# Patient Record
Sex: MALE | Race: WHITE | NOT HISPANIC OR LATINO | Employment: FULL TIME | URBAN - METROPOLITAN AREA
[De-identification: names, ages, dates, MRNs, and addresses within clinical notes are randomized per-mention and may not be internally consistent; named-entity substitution may affect disease eponyms.]

---

## 2020-08-24 ENCOUNTER — TELEMEDICINE (OUTPATIENT)
Dept: FAMILY MEDICINE CLINIC | Facility: CLINIC | Age: 20
End: 2020-08-24

## 2020-08-24 DIAGNOSIS — N50.811 TESTICULAR PAIN, RIGHT: Primary | ICD-10-CM

## 2020-08-24 PROCEDURE — 99203 OFFICE O/P NEW LOW 30 MIN: CPT | Performed by: FAMILY MEDICINE

## 2020-08-24 NOTE — ASSESSMENT & PLAN NOTE
Unable to examine patient as this was a video visit.  Differential remains broad.  Being hernia versus epididymitis versus other cause.  Will order ultrasound to further evaluate.  Will contact patient with ultrasound of results and if they are inconclusive will have patient come in for further testing.

## 2020-08-24 NOTE — PROGRESS NOTES
Conor Tabor is a 20 y.o. male who presents today to establish care.    Chief Complaint   Patient presents with   • Testicle Pain        Patient is here to established care. He was previously being seen by Dr. Mora at [facility] in [facility]. He has no chronic medical conditions. His only complaint today is of testicular pain which causes his stomach and low back to hurt as well.     Testicle Pain   The patient's primary symptoms include testicular pain. The patient's pertinent negatives include no penile discharge or penile pain. Primary symptoms comment: Patient is having pain in his right testicle, low back, and stomach since last 1-2 months. He has had this problem in the past when he was younger but his doctor was unable to find anything wrong. . This is a recurrent (5 years ago had similar pain but only involved the testicle. ) problem. The problem occurs constantly. The problem has been waxing and waning. The pain is severe (7/10). Associated symptoms include abdominal pain, flank pain, nausea and painful intercourse. Pertinent negatives include no anorexia, chest pain, chills, constipation, coughing, diarrhea, discolored urine, dysuria, fever, frequency, headaches, hematuria, hesitancy, joint pain, joint swelling, rash, shortness of breath, sore throat, urgency, urinary retention or vomiting. There is no reported injury. The testicular pain affects the right testicle. There is swelling in the right (minimal ) testicle. The color of the testicles is normal. The symptoms are aggravated by tactile pressure, intercourse and heavy lifting. He has tried nothing for the symptoms. He is sexually active. He never uses condoms. No, his partner does not have an STD. His past medical history is significant for chlamydia (Treated for clamydia in May. Feels different than that. Patient and partner both treated. ). There is no history of erectile aid use, erectile dysfunction, a femoral hernia, gonorrhea, herpes  simplex, HIV, an inguinal hernia, kidney stones or varicocele.        Review of Systems   Constitutional: Negative for chills, fever and unexpected weight loss.   HENT: Negative for congestion, ear pain and sore throat.    Eyes: Negative for visual disturbance.   Respiratory: Negative for cough, shortness of breath and wheezing.    Cardiovascular: Negative for chest pain and palpitations.   Gastrointestinal: Positive for abdominal pain and nausea. Negative for anorexia, blood in stool, constipation, diarrhea, vomiting and GERD.   Endocrine: Negative for polydipsia and polyuria.   Genitourinary: Positive for flank pain and testicular pain. Negative for decreased urine volume, difficulty urinating, discharge, dysuria, frequency, genital sores, hematuria, hesitancy, penile pain, erectile dysfunction, urgency and urinary incontinence.   Musculoskeletal: Negative for joint pain and joint swelling.   Skin: Negative for rash and skin lesions.   Allergic/Immunologic: Negative for environmental allergies.   Neurological: Negative for seizures, syncope and headache.   Hematological: Does not bruise/bleed easily.   Psychiatric/Behavioral: Negative for suicidal ideas.          History reviewed. No pertinent past medical history.     Past Surgical History:   Procedure Laterality Date   • BONE GRAFT      Maxillary bone graft after getting front tooth knocked out during baseball.    • WISDOM TOOTH EXTRACTION          Family History   Problem Relation Age of Onset   • No Known Problems Mother    • No Known Problems Father    • No Known Problems Brother    • Other Maternal Grandmother         unknown   • No Known Problems Maternal Grandfather    • Other Paternal Grandmother         unknown   • No Known Problems Paternal Grandfather    • No Known Problems Brother         Social History     Socioeconomic History   • Marital status: Single     Spouse name: Not on file   • Number of children: Not on file   • Years of education: Not on  file   • Highest education level: Not on file   Tobacco Use   • Smoking status: Current Every Day Smoker     Types: Electronic Cigarette   • Smokeless tobacco: Never Used   Substance and Sexual Activity   • Alcohol use: Yes     Frequency: 2-4 times a month     Drinks per session: 1 or 2   • Drug use: Yes     Frequency: 7.0 times per week     Types: Marijuana     Comment: daily   • Sexual activity: Yes     Partners: Female     Birth control/protection: OCP     Comment: 1 female partner in the past year.        Current Outpatient Medications on File Prior to Visit   Medication Sig Dispense Refill   • [DISCONTINUED] ondansetron ODT (ZOFRAN-ODT) 4 MG disintegrating tablet Place 1 tablet on the tongue Every 8 (Eight) Hours As Needed for Nausea or Vomiting. 6 tablet 0     No current facility-administered medications on file prior to visit.        No Known Allergies     There were no vitals taken for this visit.   There is no height or weight on file to calculate BMI.    Physical Exam   Constitutional: He is oriented to person, place, and time. He appears well-developed and well-nourished. No distress.   Pulmonary/Chest: Effort normal. No respiratory distress.   Neurological: He is alert and oriented to person, place, and time.   Psychiatric: He has a normal mood and affect.        No results found for this or any previous visit.     Problems Addressed this Visit        Nervous and Auditory    Testicular pain, right - Primary     Unable to examine patient as this was a video visit.  Differential remains broad.  Being hernia versus epididymitis versus other cause.  Will order ultrasound to further evaluate.  Will contact patient with ultrasound of results and if they are inconclusive will have patient come in for further testing.         Relevant Orders    US Scrotum & Testicles          Return in about 2 weeks (around 9/7/2020) for Follow-up testicular pain.    Parts of this office note have been dictated by voice  recognition software. Grammatical and/or spelling errors may be present.     Jose C Cai MD  8/24/2020

## 2020-08-31 ENCOUNTER — HOSPITAL ENCOUNTER (OUTPATIENT)
Dept: ULTRASOUND IMAGING | Facility: HOSPITAL | Age: 20
Discharge: HOME | End: 2020-08-31
Admitting: FAMILY MEDICINE

## 2020-08-31 DIAGNOSIS — N50.811 TESTICULAR PAIN, RIGHT: ICD-10-CM

## 2020-08-31 PROCEDURE — 76870 US EXAM SCROTUM: CPT

## 2020-09-02 DIAGNOSIS — N50.811 TESTICULAR PAIN, RIGHT: Primary | ICD-10-CM

## 2020-10-12 ENCOUNTER — OFFICE VISIT (OUTPATIENT)
Dept: UROLOGY | Age: 20
End: 2020-10-12

## 2020-10-12 VITALS
WEIGHT: 215 LBS | TEMPERATURE: 98.9 F | HEIGHT: 73 IN | HEART RATE: 85 BPM | SYSTOLIC BLOOD PRESSURE: 138 MMHG | DIASTOLIC BLOOD PRESSURE: 80 MMHG | OXYGEN SATURATION: 97 % | BODY MASS INDEX: 28.49 KG/M2

## 2020-10-12 DIAGNOSIS — N50.811 PAIN IN RIGHT TESTICLE: Primary | ICD-10-CM

## 2020-10-12 LAB
BILIRUB BLD-MCNC: NEGATIVE MG/DL
CLARITY, POC: CLEAR
COLOR UR: YELLOW
GLUCOSE UR STRIP-MCNC: NEGATIVE MG/DL
KETONES UR QL: NEGATIVE
LEUKOCYTE EST, POC: NEGATIVE
NITRITE UR-MCNC: NEGATIVE MG/ML
PH UR: 8.5 [PH] (ref 5–8)
PROT UR STRIP-MCNC: NEGATIVE MG/DL
RBC # UR STRIP: NEGATIVE /UL
SP GR UR: 1.01 (ref 1–1.03)
UROBILINOGEN UR QL: NORMAL

## 2020-10-12 PROCEDURE — 99243 OFF/OP CNSLTJ NEW/EST LOW 30: CPT | Performed by: UROLOGY

## 2020-10-12 PROCEDURE — 81003 URINALYSIS AUTO W/O SCOPE: CPT | Performed by: UROLOGY

## 2020-10-12 NOTE — PROGRESS NOTES
Chief Complaint  Right testicular pain    Referring Provider  Jose C Cai MD    HPI  Mr. Tabor is a 20 y.o. male with no PMH who presents with intermittent and chronic right testicular pain x 3 months.    Quality:    Sharp  Provocative factors:  Activity makes it worse  Palliative factors:   Rest makes it better  Radiation:   Along right testicle up and abdomen  Severity:   4-5/10 currently and same/10 at its worst  Previous treatments: None    No current LUTS  No history of vasectomy  No history of kidney stones, recent trauma or injury.  No history of constipation    He does have interest in future fertility.    He had a history of chlamydia 6 months ago, and says he was treated with antibiotics.  He denies any discharge or lower urinary tract symptoms.    Past Medical History  No past medical history on file.    Past Surgical History  Past Surgical History:   Procedure Laterality Date   • BONE GRAFT      Maxillary bone graft after getting front tooth knocked out during baseball.    • WISDOM TOOTH EXTRACTION         Medications  No current outpatient medications on file.    Allergies  No Known Allergies    Social History  Social History     Socioeconomic History   • Marital status: Single     Spouse name: Not on file   • Number of children: Not on file   • Years of education: Not on file   • Highest education level: Not on file   Tobacco Use   • Smoking status: Current Every Day Smoker     Types: Electronic Cigarette   • Smokeless tobacco: Never Used   Substance and Sexual Activity   • Alcohol use: Yes     Frequency: 2-4 times a month     Drinks per session: 1 or 2   • Drug use: Yes     Frequency: 7.0 times per week     Types: Marijuana     Comment: daily   • Sexual activity: Yes     Partners: Female     Birth control/protection: OCP     Comment: 1 female partner in the past year.       Family History  He has no family history of kidney stones    Review of Systems  Constitutional: No fevers or  "chills  Skin: Negative for rash  Endocrine: No heat/cold intolerance   Cardiovascular: Negative for chest pain or dyspnea on exertion  Respiratory: Negative for shortness of breath or wheezing  Gastrointestinal: No nausea or vomiting  Genitourinary: Negative for new lower urinary tract symptoms, current gross hematuria or dysuria.  Musculoskeletal: No flank pain  Neurological:  Negative for frequent headaches or dizziness  Lymph/Heme: Negative for leg swelling or calf pain.    Physical Exam  Visit Vitals  /80   Pulse 85   Temp 98.9 °F (37.2 °C)   Ht 185.4 cm (73\")   Wt 97.5 kg (215 lb)   SpO2 97%   BMI 28.37 kg/m²     Constitutional: NAD, WDWN.   HEENT: NCAT. Conjunctivae normal.  MMM.    Cardiovascular: Regular rate.  Pulmonary/Chest: Respirations are even and non-labored bilaterally.  Abdominal: Soft. No distension, tenderness, masses or guarding. No CVA tenderness.  Neurological: A + O x 3.  Cranial Nerves II-XII grossly intact. Normal gait.  Extremities: LILIANA x 4, Warm. No clubbing.  No cyanosis.    Skin: Pink, warm and dry.  No rashes noted.  Psychiatric:  Normal mood and affect    Genitourinary  Penis: circumcised penis, glans normal, no penile discharge.  No rashes/lesions.    Testes: descended bilaterally, no masses, LEFT epididymis nontender to palpation,  RIGHT epididymis nontender to palpation. No varicocele palpated. No hernia appreciated.      Labs  Brief Urine Lab Results  (Last result in the past 365 days)      Color   Clarity   Blood   Leuk Est   Nitrite   Protein   CREAT   Urine HCG        10/12/20 0938 Yellow Clear Negative Negative Negative Negative               No results found for: GLUCOSE, CALCIUM, NA, K, CO2, CL, BUN, CREATININE, EGFRIFAFRI, EGFRIFNONA, BCR, ANIONGAP    Radiologic Studies  Us Scrotum & Testicles    Result Date: 8/31/2020  Right epididymal head cyst is sub-centimeter in size without associated hyperemia at this site or adjacent with trace right hydrocele. No " intratesticular mass lesion or ischemia.  D:  08/31/2020 E:  08/31/2020     This report was finalized on 8/31/2020 5:06 PM by Dr. Kemal Granger.      I have reviewed all his labs and imaging.    Assessment  Mr. Tabor is a 20 y.o. male who presents with chronic right testicular pain.  He has a completely normal physical exam.    We discussed different strategies for management including conservative therapy, pain medications (gabapentin, nortriptyline) and surgical interventions (cord denervation).    I reassured him based on his physical exam and imaging that this is likely self limited and will resolve with time with the below conservative therapies.    Plan  1.  Scrotal support continuously, especially with increased activity.  2.  Warm bath soaks twice daily.  3.  NSAIDs for pain as instructed with food.  4.  Cord block at next visit if still having symptoms after few months.  Follow-up as needed.      Return if symptoms worsen or fail to improve.    Davis López MD

## 2020-10-28 ENCOUNTER — TELEPHONE (OUTPATIENT)
Dept: UROLOGY | Facility: CLINIC | Age: 20
End: 2020-10-28

## 2020-10-28 NOTE — TELEPHONE ENCOUNTER
Pt called and wants to know if you can see him without fu scrotal us. He is being charged $500 each us. Please advise if this is needed before appt.

## 2021-04-12 ENCOUNTER — IMMUNIZATION (OUTPATIENT)
Dept: VACCINE CLINIC | Facility: HOSPITAL | Age: 21
End: 2021-04-12

## 2021-04-12 PROCEDURE — 91300 HC SARSCOV02 VAC 30MCG/0.3ML IM: CPT | Performed by: INTERNAL MEDICINE

## 2021-04-12 PROCEDURE — 0001A: CPT | Performed by: INTERNAL MEDICINE

## 2021-05-03 ENCOUNTER — IMMUNIZATION (OUTPATIENT)
Dept: VACCINE CLINIC | Facility: HOSPITAL | Age: 21
End: 2021-05-03

## 2021-05-03 PROCEDURE — 0002A: CPT | Performed by: INTERNAL MEDICINE

## 2021-05-03 PROCEDURE — 91300 HC SARSCOV02 VAC 30MCG/0.3ML IM: CPT | Performed by: INTERNAL MEDICINE

## 2024-08-13 PROBLEM — J02.9 SORE THROAT: Status: ACTIVE | Noted: 2024-08-13

## 2024-08-16 ENCOUNTER — PATIENT ROUNDING (BHMG ONLY) (OUTPATIENT)
Dept: URGENT CARE | Facility: CLINIC | Age: 24
End: 2024-08-16
Payer: COMMERCIAL

## 2025-06-24 ENCOUNTER — HOSPITAL ENCOUNTER (EMERGENCY)
Facility: HOSPITAL | Age: 25
Discharge: HOME | End: 2025-06-24
Attending: STUDENT IN AN ORGANIZED HEALTH CARE EDUCATION/TRAINING PROGRAM | Admitting: STUDENT IN AN ORGANIZED HEALTH CARE EDUCATION/TRAINING PROGRAM
Payer: COMMERCIAL

## 2025-06-24 VITALS
RESPIRATION RATE: 16 BRPM | WEIGHT: 215 LBS | HEART RATE: 85 BPM | SYSTOLIC BLOOD PRESSURE: 148 MMHG | TEMPERATURE: 99.1 F | HEIGHT: 73 IN | BODY MASS INDEX: 28.49 KG/M2 | DIASTOLIC BLOOD PRESSURE: 89 MMHG | OXYGEN SATURATION: 96 %

## 2025-06-24 DIAGNOSIS — K08.89 PAIN, DENTAL: Primary | ICD-10-CM

## 2025-06-24 PROCEDURE — 99283 EMERGENCY DEPT VISIT LOW MDM: CPT | Performed by: STUDENT IN AN ORGANIZED HEALTH CARE EDUCATION/TRAINING PROGRAM

## 2025-06-24 RX ORDER — HYDROCODONE BITARTRATE AND ACETAMINOPHEN 5; 325 MG/1; MG/1
1 TABLET ORAL ONCE
Refills: 0 | Status: DISCONTINUED | OUTPATIENT
Start: 2025-06-24 | End: 2025-06-24 | Stop reason: HOSPADM

## 2025-06-24 RX ORDER — IBUPROFEN 800 MG/1
800 TABLET, FILM COATED ORAL ONCE
Status: COMPLETED | OUTPATIENT
Start: 2025-06-24 | End: 2025-06-24

## 2025-06-24 RX ORDER — IBUPROFEN 800 MG/1
800 TABLET, FILM COATED ORAL EVERY 8 HOURS PRN
Qty: 15 TABLET | Refills: 0 | Status: SHIPPED | OUTPATIENT
Start: 2025-06-24

## 2025-06-24 RX ADMIN — AMOXICILLIN AND CLAVULANATE POTASSIUM 1 TABLET: 875; 125 TABLET, FILM COATED ORAL at 20:28

## 2025-06-24 RX ADMIN — IBUPROFEN 800 MG: 800 TABLET, FILM COATED ORAL at 20:27

## 2025-06-24 NOTE — Clinical Note
[facility] EMERGENCY DEPARTMENT  [address] [address]  Phone: [phone]    Conor Tabor was seen and treated in our emergency department on 6/24/2025.  He may return to work on 06/26/2025.         Thank you for choosing [facility].    Pee Terry DO

## 2025-06-25 NOTE — DISCHARGE INSTRUCTIONS
Instructions from Dr. Terry:    It was a privilege being your ER physician today.    Please follow-up in clinic as we discussed.    Please return to the ER if you experience any worsening symptoms or for any other concerns.

## 2025-06-25 NOTE — ED PROVIDER NOTES
[facility]  Emergency Department Encounter  Emergency Medicine Physician Note       Pt Name: Conor Tabor  MRN: [id number]  Pt :   2000  Room Number:  22SF/22  Date of encounter:  2025  PCP: Brandi Roe APRN  ED Physician: Pee Terry DO    HPI:  Conor Tabor is a 25 y.o. male who presents to the ED with chief complaint of dental pain.    Onset: 2 weeks  Timing: Gradual  Location: Right upper tooth  Duration: Constant  Modifying factors: Denies  Severity: Mild to moderate    Denies any other associated symptoms.   Scheduled to see dentistry on Thursday.  No stated past medical history.    PAST MEDICAL HISTORY  History reviewed. No pertinent past medical history.  Current Outpatient Medications   Medication Instructions    albuterol sulfate  (90 Base) MCG/ACT inhaler 2 puffs, Inhalation, Every 4 Hours PRN    amoxicillin-clavulanate (AUGMENTIN) 875-125 MG per tablet 1 tablet, Oral, 2 Times Daily    brompheniramine-pseudoephedrine-DM 30-2-10 MG/5ML syrup 5 mL, Oral, 4 Times Daily PRN    ibuprofen (ADVIL,MOTRIN) 800 mg, Oral, Every 8 Hours PRN      PAST SURGICAL HISTORY  Past Surgical History:   Procedure Laterality Date    BONE GRAFT      Maxillary bone graft after getting front tooth knocked out during baseball.     WISDOM TOOTH EXTRACTION         FAMILY HISTORY  Family History   Problem Relation Age of Onset    No Known Problems Mother     No Known Problems Father     No Known Problems Brother     Other Maternal Grandmother         unknown    No Known Problems Maternal Grandfather     Other Paternal Grandmother         unknown    No Known Problems Paternal Grandfather     No Known Problems Brother        SOCIAL HISTORY  Social History     Socioeconomic History    Marital status: Single   Tobacco Use    Smoking status: Never    Smokeless tobacco: Never   Vaping Use    Vaping status: Former    Substances: Nicotine, Flavoring    Devices: Disposable    Substance and Sexual Activity    Alcohol use: Yes    Drug use: Not Currently     Frequency: 7.0 times per week     Types: Marijuana     Comment: daily    Sexual activity: Yes     Partners: Female     Birth control/protection: OCP     Comment: 1 female partner in the past year.     ALLERGIES  Patient has no known allergies.    REVIEW OF SYSTEMS  All systems reviewed and negative except for those discussed in HPI.     PHYSICAL EXAM  ED Triage Vitals [06/24/25 1950]   Temp Heart Rate Resp BP SpO2   99.1 °F (37.3 °C) 85 16 148/89 96 %      Temp src Heart Rate Source Patient Position BP Location FiO2 (%)   Oral Monitor Sitting Left arm --     I have reviewed the triage vital signs and nursing notes.    General: Alert.  Nontoxic appearance.  No acute distress.  Head: Normocephalic.  Atraumatic.  Eyes: No scleral icterus.  ENT: + tenderness right upper 1st molar. No gumline abscess. Moist mucous membranes.  Uvula is midline.  No erythema.  No exudate.  No submandibular edema.  Tolerating oral secretions appropriately.  Neck: Supple.  Full range of motion without pain.  No meningismus.  Respiratory: Nonlabored breathing.  GI: Abdomen is nondistended.  Neurologic: Oriented x 3.  No focal deficits.  Skin: No erythema.  No edema.    LAB RESULTS  No results found for this or any previous visit (from the past 24 hours).    RADIOLOGY  No Radiology Exams Resulted Within Past 24 Hours    PROCEDURES  Procedures    RISK STRATIFICATION    MEDICAL DECISION MAKING  25 y.o. male who presents with dental pain.     Vital signs within normal limits.     Clinical presentation and physical exam consistent with dental pain likely secondary to pulpitis.  No clinical evidence of gumline abscess, Aaron's angina, deep space infection, airway compromise.      No clinical indication for labs or imaging.     I have discussed the indication, risk, and alternatives of the following high risk medications: Oral Norco.     Medications administered in  ED:  Medications   HYDROcodone-acetaminophen (NORCO) 5-325 MG per tablet 1 tablet (1 tablet Oral Not Given 6/24/25 2028)   ibuprofen (ADVIL,MOTRIN) tablet 800 mg (800 mg Oral Given 6/24/25 2027)   amoxicillin-clavulanate (AUGMENTIN) 875-125 MG per tablet 1 tablet (1 tablet Oral Given 6/24/25 2028)     I discussed the findings of the ED work with the patient at bedside.  No clinical indication for admission. I recommended outpatient follow-up with dentistry/PCP.  Patient was deemed medically stable for discharge with close outpatient follow-up and strict ED return precautions.  Patient agreeable with plan disposition.    Home medications were reviewed.  Prescriptions for discharge: Augmentin, Ibuprofen    Chronic conditions affecting care: None    Social determinants of health impacting treatment or disposition: None    REPEAT VITAL SIGNS  AS OF 20:34 EDT VITALS:  BP - 148/89  HR - 85  TEMP - 99.1 °F (37.3 °C) (Oral)  O2 SATS - 96%    DIAGNOSIS  Final diagnoses:   Pain, dental     DISPOSITION  ED Disposition       ED Disposition   Discharge    Condition   Stable    Comment   --               PATIENT REFERRALS  Brandi Roe, APRALEX  [address] [place] [address] [address]  [address]  [facility] [address] [phone]      PCP. 48 hours.    Dentistry  For previously scheduled appointment.              Please note that portions of this document were completed with voice recognition software.        Pee Terry DO  06/25/25 [id number]